# Patient Record
Sex: MALE | Race: WHITE
[De-identification: names, ages, dates, MRNs, and addresses within clinical notes are randomized per-mention and may not be internally consistent; named-entity substitution may affect disease eponyms.]

---

## 2017-03-20 ENCOUNTER — HOSPITAL ENCOUNTER (OUTPATIENT)
Dept: HOSPITAL 45 - C.LABPVFM | Age: 72
Discharge: HOME | End: 2017-03-20
Attending: FAMILY MEDICINE
Payer: COMMERCIAL

## 2017-03-20 DIAGNOSIS — R73.01: ICD-10-CM

## 2017-03-20 DIAGNOSIS — E78.5: Primary | ICD-10-CM

## 2017-03-20 DIAGNOSIS — I10: ICD-10-CM

## 2017-03-20 DIAGNOSIS — E55.9: ICD-10-CM

## 2017-03-20 LAB
ANION GAP SERPL CALC-SCNC: 5 MMOL/L (ref 3–11)
BASOPHILS # BLD: 0.03 K/UL (ref 0–0.2)
BASOPHILS NFR BLD: 0.4 %
BUN SERPL-MCNC: 16 MG/DL (ref 7–18)
BUN/CREAT SERPL: 14.4 (ref 10–20)
CALCIUM SERPL-MCNC: 9.4 MG/DL (ref 8.5–10.1)
CHLORIDE SERPL-SCNC: 103 MMOL/L (ref 98–107)
CHOLEST/HDLC SERPL: 3 {RATIO}
CO2 SERPL-SCNC: 33 MMOL/L (ref 21–32)
COMPLETE: YES
CREAT SERPL-MCNC: 1.1 MG/DL (ref 0.6–1.4)
EOSINOPHIL NFR BLD AUTO: 259 K/UL (ref 130–400)
EST. AVERAGE GLUCOSE BLD GHB EST-MCNC: 131 MG/DL
GLUCOSE SERPL-MCNC: 116 MG/DL (ref 70–99)
GLUCOSE UR QL: 47 MG/DL
HCT VFR BLD CALC: 44.9 % (ref 42–52)
IG%: 0.1 %
IMM GRANULOCYTES NFR BLD AUTO: 26.8 %
KETONES UR QL STRIP: 64 MG/DL
LYMPHOCYTES # BLD: 2.09 K/UL (ref 1.2–3.4)
MCH RBC QN AUTO: 29.5 PG (ref 25–34)
MCHC RBC AUTO-ENTMCNC: 33.6 G/DL (ref 32–36)
MCV RBC AUTO: 87.7 FL (ref 80–100)
MONOCYTES NFR BLD: 11.8 %
NEUTROPHILS # BLD AUTO: 3.7 %
NEUTROPHILS NFR BLD AUTO: 57.2 %
NITRITE UR QL STRIP: 138 MG/DL (ref 0–150)
PH UR: 139 MG/DL (ref 0–200)
PMV BLD AUTO: 10.9 FL (ref 7.4–10.4)
POTASSIUM SERPL-SCNC: 4 MMOL/L (ref 3.5–5.1)
RBC # BLD AUTO: 5.12 M/UL (ref 4.7–6.1)
SODIUM SERPL-SCNC: 141 MMOL/L (ref 136–145)
VERY LOW DENSITY LIPOPROT CALC: 28 MG/DL
WBC # BLD AUTO: 7.79 K/UL (ref 4.8–10.8)

## 2017-03-24 NOTE — CODING QUERY MEDICAL NECESSITY
SUPPORTING DIAGNOSIS NEEDED



Dr. Prater,



A supporting diagnosis is required for the test/procedure performed on this patient in 
order for us to be reimbursed by the patient's insurance. Please provide a supporting 
diagnosis for the following test/procedure listed below next to the test name along with 
your signature. 



*If there is no additional diagnosis for this patient that would support the following 
test/procedure please document that below next to the test/procedure.



Test(s)/Procedure(s) that require a supporting diagnosis:





* ([id number]) VITAMIN D ASSAY          DIAGNOSIS:





***DATE OF SERVICE: 3/20/17***





Provider Signature:  ______________________________  Date:  _______



Thank you  

Dakota Johnson

[facility] Information Management

Phone:  [phone]

Fax:  [phone]



Once completed, please kindly fax back to [phone]



For questions please call [phone]

## 2017-09-15 ENCOUNTER — HOSPITAL ENCOUNTER (OUTPATIENT)
Dept: HOSPITAL 45 - C.LABPVFM | Age: 72
Discharge: HOME | End: 2017-09-15
Attending: FAMILY MEDICINE
Payer: COMMERCIAL

## 2017-09-15 DIAGNOSIS — K21.9: ICD-10-CM

## 2017-09-15 DIAGNOSIS — I10: ICD-10-CM

## 2017-09-15 DIAGNOSIS — E11.9: ICD-10-CM

## 2017-09-15 DIAGNOSIS — E78.5: Primary | ICD-10-CM

## 2017-09-15 LAB
ALBUMIN/GLOB SERPL: 1.4 {RATIO} (ref 0.9–2)
ALP SERPL-CCNC: 91 U/L (ref 45–117)
ALT SERPL-CCNC: 31 U/L (ref 12–78)
ANION GAP SERPL CALC-SCNC: 4 MMOL/L (ref 3–11)
AST SERPL-CCNC: 25 U/L (ref 15–37)
BUN SERPL-MCNC: 16 MG/DL (ref 7–18)
BUN/CREAT SERPL: 16.4 (ref 10–20)
CALCIUM SERPL-MCNC: 9.6 MG/DL (ref 8.5–10.1)
CHLORIDE SERPL-SCNC: 104 MMOL/L (ref 98–107)
CHOLEST/HDLC SERPL: 2.3 {RATIO}
CO2 SERPL-SCNC: 33 MMOL/L (ref 21–32)
CREAT SERPL-MCNC: 1 MG/DL (ref 0.6–1.4)
CREAT UR-MCNC: 100 MG/DL
EST. AVERAGE GLUCOSE BLD GHB EST-MCNC: 134 MG/DL
GLOBULIN SER-MCNC: 3 GM/DL (ref 2.5–4)
GLUCOSE SERPL-MCNC: 113 MG/DL (ref 70–99)
GLUCOSE UR QL: 52 MG/DL
KETONES UR QL STRIP: 49 MG/DL
NITRITE UR QL STRIP: 99 MG/DL (ref 0–150)
PH UR: 121 MG/DL (ref 0–200)
POTASSIUM SERPL-SCNC: 3.9 MMOL/L (ref 3.5–5.1)
RATIO: (no result) MCG/MG (ref 0–30)
SODIUM SERPL-SCNC: 141 MMOL/L (ref 136–145)
VERY LOW DENSITY LIPOPROT CALC: 20 MG/DL

## 2018-03-16 ENCOUNTER — HOSPITAL ENCOUNTER (OUTPATIENT)
Dept: HOSPITAL 45 - C.LABPVFM | Age: 73
Discharge: HOME | End: 2018-03-16
Attending: FAMILY MEDICINE
Payer: COMMERCIAL

## 2018-03-16 DIAGNOSIS — F32.9: ICD-10-CM

## 2018-03-16 DIAGNOSIS — K21.9: ICD-10-CM

## 2018-03-16 DIAGNOSIS — E11.9: ICD-10-CM

## 2018-03-16 DIAGNOSIS — I10: ICD-10-CM

## 2018-03-16 DIAGNOSIS — E78.5: Primary | ICD-10-CM

## 2018-03-16 LAB
ALBUMIN SERPL-MCNC: 3.9 GM/DL (ref 3.4–5)
ALP SERPL-CCNC: 83 U/L (ref 45–117)
ALT SERPL-CCNC: 36 U/L (ref 12–78)
AST SERPL-CCNC: 22 U/L (ref 15–37)
BUN SERPL-MCNC: 14 MG/DL (ref 7–18)
CALCIUM SERPL-MCNC: 9.7 MG/DL (ref 8.5–10.1)
CO2 SERPL-SCNC: 29 MMOL/L (ref 21–32)
CREAT SERPL-MCNC: 1.11 MG/DL (ref 0.6–1.4)
GLUCOSE SERPL-MCNC: 132 MG/DL (ref 70–99)
HBA1C MFR BLD: 6.2 % (ref 4.5–5.6)
KETONES UR QL STRIP: 72 MG/DL
PH UR: 150 MG/DL (ref 0–200)
POTASSIUM SERPL-SCNC: 3.8 MMOL/L (ref 3.5–5.1)
PROT SERPL-MCNC: 7.1 GM/DL (ref 6.4–8.2)
SODIUM SERPL-SCNC: 139 MMOL/L (ref 136–145)